# Patient Record
Sex: MALE | Race: BLACK OR AFRICAN AMERICAN | NOT HISPANIC OR LATINO | URBAN - METROPOLITAN AREA
[De-identification: names, ages, dates, MRNs, and addresses within clinical notes are randomized per-mention and may not be internally consistent; named-entity substitution may affect disease eponyms.]

---

## 2017-10-23 VITALS
DIASTOLIC BLOOD PRESSURE: 75 MMHG | OXYGEN SATURATION: 98 % | TEMPERATURE: 98 F | RESPIRATION RATE: 16 BRPM | HEART RATE: 72 BPM | WEIGHT: 302.92 LBS | HEIGHT: 78 IN | SYSTOLIC BLOOD PRESSURE: 129 MMHG

## 2017-10-23 RX ORDER — CHLORHEXIDINE GLUCONATE 213 G/1000ML
1 SOLUTION TOPICAL ONCE
Qty: 0 | Refills: 0 | Status: DISCONTINUED | OUTPATIENT
Start: 2017-10-24 | End: 2017-10-25

## 2017-10-23 NOTE — H&P ADULT - PMH
DM type 2 (diabetes mellitus, type 2)    Essential hypertension    HLD (hyperlipidemia)    PVD (peripheral vascular disease)  s/p PTA of Left AT

## 2017-10-23 NOTE — H&P ADULT - NSHPLABSRESULTS_GEN_ALL_CORE
11.7   8.0   )-----------( 220      ( 24 Oct 2017 15:10 )             35.6   10-24    140  |  101  |  15  ----------------------------<  87  4.0   |  26  |  0.76    Ca    9.2      24 Oct 2017 15:10    PT/INR - ( 24 Oct 2017 15:10 )   PT: 13.4 sec;   INR: 1.20          PTT - ( 24 Oct 2017 15:10 )  PTT:33.8 sec

## 2017-10-23 NOTE — H&P ADULT - ADDITIONAL PE
EKG: Sinus rhythm @ 68 bpm; sinus arrythmia with 1st degree AV block; LAD; inverted T waves in lead 3, AVF

## 2017-10-23 NOTE — H&P ADULT - ATTENDING COMMENTS
Agree w History and Physical, History of Present Illness, Allergies/Medications, Patient History, Risk Assessment, Physical Exam, Labs and Results, Assessment and Plan

## 2017-10-23 NOTE — H&P ADULT - SKIN COMMENTS
pigmentation and ulcers present on B/L lower legs pigmentation and ulcers present on B/L lower legs, ulcer present on L great toe

## 2017-10-23 NOTE — H&P ADULT - ASSESSMENT
61 y/o M, former cocaine abuser (27 yrs ago), PMHx significant for HTN, HLD, DM-II, PVD, s/p angioplasty of Left AT who presents for left heart cardiac cath with possible intervention for suspected CAD secondary to pt's risk factors, CCS class III anginal equivalent symptoms, abnormal ECG and Echo.    H/H 11.7/35.6 .Pt denies bleeding, GI bleeding, hematemesis, hematuria, BRBPR or melena . mg X 1 and Plavix 600 mg X 1given pre-cath.  Cr. 0.76. LVEF unknown. Pt. on Lasix 80 mg for chronic lower extremity edema.  Pt. euvolemic in exam. IV NS 0.45 % @ 50 cc/hr pre-cath.  Risks & benefits of procedure and alternative therapy have been explained to the patient including but not limited to: allergic reaction, bleeding w/possible need for blood transfusion, infection, renal and vascular compromise, limb damage, arrhythmia, stroke, vessel dissection/perforation, Myocardial infarction, emergent CABG. Informed consent obtained and in chart. 63 y/o M, former cocaine abuser (27 yrs ago), PMHx significant for HTN, HLD, DM-II, PVD, s/p angioplasty of Left AT, Chronic B/L lower extremity edema who presents for left heart cardiac cath with possible intervention for suspected CAD secondary to pt's risk factors, CCS class III anginal equivalent symptoms, abnormal ECG and Echo.    H/H 11.7/35.6 .Pt denies bleeding, GI bleeding, hematemesis, hematuria, BRBPR or melena . mg X 1 and Plavix 600 mg X 1given pre-cath.  Cr. 0.76. LVEF unknown. Pt. on Lasix 80 mg for B/L chronic lower extremity edema.  Pt. euvolemic in exam. IV NS 0.45 % @ 50 cc/hr pre-cath.  Risks & benefits of procedure and alternative therapy have been explained to the patient including but not limited to: allergic reaction, bleeding w/possible need for blood transfusion, infection, renal and vascular compromise, limb damage, arrhythmia, stroke, vessel dissection/perforation, Myocardial infarction, emergent CABG. Informed consent obtained and in chart.

## 2017-10-23 NOTE — H&P ADULT - HISTORY OF PRESENT ILLNESS
***POOR HISTORIAN***    63 y/o M, former cocaine abuser (27 yrs ago), PMHx significant for HTN, HLD, DM-II, PVD, s/p angioplasty of Left AT as per MD's note, who presented to his cardiologist with c/o SHERIFF, on walking 3 blocks, relieved with rest.  As per MD's note, pt also c/o chest pressure, however he denies it.  Pt was noted to have inferior wall MI on his ECG and severe LVH.  Pt c/o LE edema b/l, Left >>Right.  Pt also has pain on walking on less than half a block, Right>>Left, and left great toe ulcer.  Denies any dizziness, n/v, diaphoresis, orthopnea, PND, palpitations, syncope.    In light of pt's risk factors, CCS class II-III anginal equivalent symptoms, and abnormal ECG, he is recommended for cardiac cath with possible intervention for suspected CAD. ***POOR HISTORIAN***    61 y/o M, former cocaine abuser (27 yrs ago), PMHx significant for HTN, HLD, DM-II, PVD, s/p angioplasty of Left AT as per MD's note, who presented to his cardiologist with c/o SHERIFF, on walking 2-3 blocks, relieved with rest.  As per MD's note, pt also c/o chest pressure, however he denies it.  Pt was noted to have inferior wall MI on his ECG and echo revealed severe lvh and inferior wall hk (as per MD note)..  Pt c/o LE edema b/l, Left >>Right.  Pt also has pain on walking on less than half a block, Right>>Left, and left great toe ulcer.  Denies any dizziness, n/v, diaphoresis, orthopnea, PND, palpitations, syncope.    In light of pt's risk factors, CCS class III anginal equivalent symptoms, and abnormal ECG, he is recommended for cardiac cath with possible intervention for suspected CAD. ***POOR HISTORIAN***    63 y/o M, former cocaine abuser (27 yrs ago), PMHx significant for HTN, HLD, DM-II, PVD, s/p angioplasty of Left AT as per MD's note, chronic B/L lower extremity edema who presented to his cardiologist with c/o SHERIFF, on walking 2-3 blocks, relieved with rest.  As per MD's note, pt also c/o chest pressure, however he denies it.  Pt was noted to have inferior wall MI on his ECG and echo revealed severe lvh and inferior wall hk (as per MD note)..  Pt c/o LE edema b/l, Left >>Right.  Pt also has pain on walking on less than half a block, Right>>Left, and left great toe ulcer.  Denies any dizziness, n/v, diaphoresis, orthopnea, PND, palpitations, syncope.    In light of pt's risk factors, CCS class III anginal equivalent symptoms, and abnormal ECG, he is recommended for cardiac cath with possible intervention for suspected CAD.

## 2017-10-24 ENCOUNTER — INPATIENT (INPATIENT)
Facility: HOSPITAL | Age: 62
LOS: 0 days | Discharge: HOME HEALTH | DRG: 287 | End: 2017-10-25
Attending: INTERNAL MEDICINE | Admitting: INTERNAL MEDICINE
Payer: MEDICARE

## 2017-10-24 LAB
ANION GAP SERPL CALC-SCNC: 13 MMOL/L — SIGNIFICANT CHANGE UP (ref 5–17)
APTT BLD: 33.8 SEC — SIGNIFICANT CHANGE UP (ref 27.5–37.4)
BASOPHILS NFR BLD AUTO: 0.3 % — SIGNIFICANT CHANGE UP (ref 0–2)
BUN SERPL-MCNC: 15 MG/DL — SIGNIFICANT CHANGE UP (ref 7–23)
CALCIUM SERPL-MCNC: 9.2 MG/DL — SIGNIFICANT CHANGE UP (ref 8.4–10.5)
CHLORIDE SERPL-SCNC: 101 MMOL/L — SIGNIFICANT CHANGE UP (ref 96–108)
CO2 SERPL-SCNC: 26 MMOL/L — SIGNIFICANT CHANGE UP (ref 22–31)
CREAT SERPL-MCNC: 0.76 MG/DL — SIGNIFICANT CHANGE UP (ref 0.5–1.3)
EOSINOPHIL NFR BLD AUTO: 2.4 % — SIGNIFICANT CHANGE UP (ref 0–6)
GLUCOSE SERPL-MCNC: 87 MG/DL — SIGNIFICANT CHANGE UP (ref 70–99)
HCT VFR BLD CALC: 35.6 % — LOW (ref 39–50)
HGB BLD-MCNC: 11.7 G/DL — LOW (ref 13–17)
INR BLD: 1.2 — HIGH (ref 0.88–1.16)
LYMPHOCYTES # BLD AUTO: 22.1 % — SIGNIFICANT CHANGE UP (ref 13–44)
MCHC RBC-ENTMCNC: 27 PG — SIGNIFICANT CHANGE UP (ref 27–34)
MCHC RBC-ENTMCNC: 32.9 G/DL — SIGNIFICANT CHANGE UP (ref 32–36)
MCV RBC AUTO: 82.2 FL — SIGNIFICANT CHANGE UP (ref 80–100)
MONOCYTES NFR BLD AUTO: 9.3 % — SIGNIFICANT CHANGE UP (ref 2–14)
NEUTROPHILS NFR BLD AUTO: 65.9 % — SIGNIFICANT CHANGE UP (ref 43–77)
PLATELET # BLD AUTO: 220 K/UL — SIGNIFICANT CHANGE UP (ref 150–400)
POTASSIUM SERPL-MCNC: 4 MMOL/L — SIGNIFICANT CHANGE UP (ref 3.5–5.3)
POTASSIUM SERPL-SCNC: 4 MMOL/L — SIGNIFICANT CHANGE UP (ref 3.5–5.3)
PROTHROM AB SERPL-ACNC: 13.4 SEC — HIGH (ref 9.8–12.7)
RBC # BLD: 4.33 M/UL — SIGNIFICANT CHANGE UP (ref 4.2–5.8)
RBC # FLD: 13.8 % — SIGNIFICANT CHANGE UP (ref 10.3–16.9)
SODIUM SERPL-SCNC: 140 MMOL/L — SIGNIFICANT CHANGE UP (ref 135–145)
WBC # BLD: 8 K/UL — SIGNIFICANT CHANGE UP (ref 3.8–10.5)
WBC # FLD AUTO: 8 K/UL — SIGNIFICANT CHANGE UP (ref 3.8–10.5)

## 2017-10-24 PROCEDURE — 93458 L HRT ARTERY/VENTRICLE ANGIO: CPT | Mod: 26

## 2017-10-24 PROCEDURE — 93010 ELECTROCARDIOGRAM REPORT: CPT | Mod: 25,59

## 2017-10-24 PROCEDURE — 99222 1ST HOSP IP/OBS MODERATE 55: CPT | Mod: 25

## 2017-10-24 RX ORDER — NIFEDIPINE 30 MG
1 TABLET, EXTENDED RELEASE 24 HR ORAL
Qty: 0 | Refills: 0 | COMMUNITY

## 2017-10-24 RX ORDER — SODIUM CHLORIDE 9 MG/ML
1000 INJECTION, SOLUTION INTRAVENOUS
Qty: 0 | Refills: 0 | Status: DISCONTINUED | OUTPATIENT
Start: 2017-10-24 | End: 2017-10-25

## 2017-10-24 RX ORDER — SODIUM CHLORIDE 9 MG/ML
500 INJECTION, SOLUTION INTRAVENOUS
Qty: 0 | Refills: 0 | Status: DISCONTINUED | OUTPATIENT
Start: 2017-10-24 | End: 2017-10-24

## 2017-10-24 RX ORDER — ASPIRIN/CALCIUM CARB/MAGNESIUM 324 MG
325 TABLET ORAL ONCE
Qty: 0 | Refills: 0 | Status: COMPLETED | OUTPATIENT
Start: 2017-10-24 | End: 2017-10-24

## 2017-10-24 RX ORDER — FUROSEMIDE 40 MG
80 TABLET ORAL DAILY
Qty: 0 | Refills: 0 | Status: DISCONTINUED | OUTPATIENT
Start: 2017-10-24 | End: 2017-10-25

## 2017-10-24 RX ORDER — DEXTROSE 50 % IN WATER 50 %
1 SYRINGE (ML) INTRAVENOUS ONCE
Qty: 0 | Refills: 0 | Status: DISCONTINUED | OUTPATIENT
Start: 2017-10-24 | End: 2017-10-25

## 2017-10-24 RX ORDER — METOPROLOL TARTRATE 50 MG
12.5 TABLET ORAL
Qty: 0 | Refills: 0 | Status: DISCONTINUED | OUTPATIENT
Start: 2017-10-24 | End: 2017-10-25

## 2017-10-24 RX ORDER — DEXTROSE 50 % IN WATER 50 %
25 SYRINGE (ML) INTRAVENOUS ONCE
Qty: 0 | Refills: 0 | Status: DISCONTINUED | OUTPATIENT
Start: 2017-10-24 | End: 2017-10-25

## 2017-10-24 RX ORDER — LOSARTAN POTASSIUM 100 MG/1
1 TABLET, FILM COATED ORAL
Qty: 0 | Refills: 0 | COMMUNITY

## 2017-10-24 RX ORDER — SIMVASTATIN 20 MG/1
20 TABLET, FILM COATED ORAL AT BEDTIME
Qty: 0 | Refills: 0 | Status: DISCONTINUED | OUTPATIENT
Start: 2017-10-24 | End: 2017-10-25

## 2017-10-24 RX ORDER — ASPIRIN/CALCIUM CARB/MAGNESIUM 324 MG
81 TABLET ORAL DAILY
Qty: 0 | Refills: 0 | Status: DISCONTINUED | OUTPATIENT
Start: 2017-10-24 | End: 2017-10-25

## 2017-10-24 RX ORDER — CLOPIDOGREL BISULFATE 75 MG/1
600 TABLET, FILM COATED ORAL ONCE
Qty: 0 | Refills: 0 | Status: COMPLETED | OUTPATIENT
Start: 2017-10-24 | End: 2017-10-24

## 2017-10-24 RX ORDER — FUROSEMIDE 40 MG
1 TABLET ORAL
Qty: 0 | Refills: 0 | COMMUNITY

## 2017-10-24 RX ORDER — DEXTROSE 50 % IN WATER 50 %
12.5 SYRINGE (ML) INTRAVENOUS ONCE
Qty: 0 | Refills: 0 | Status: DISCONTINUED | OUTPATIENT
Start: 2017-10-24 | End: 2017-10-25

## 2017-10-24 RX ORDER — INSULIN LISPRO 100/ML
VIAL (ML) SUBCUTANEOUS
Qty: 0 | Refills: 0 | Status: DISCONTINUED | OUTPATIENT
Start: 2017-10-24 | End: 2017-10-25

## 2017-10-24 RX ORDER — LOSARTAN POTASSIUM 100 MG/1
100 TABLET, FILM COATED ORAL DAILY
Qty: 0 | Refills: 0 | Status: DISCONTINUED | OUTPATIENT
Start: 2017-10-24 | End: 2017-10-25

## 2017-10-24 RX ORDER — NIFEDIPINE 30 MG
90 TABLET, EXTENDED RELEASE 24 HR ORAL DAILY
Qty: 0 | Refills: 0 | Status: DISCONTINUED | OUTPATIENT
Start: 2017-10-24 | End: 2017-10-25

## 2017-10-24 RX ORDER — GLUCAGON INJECTION, SOLUTION 0.5 MG/.1ML
1 INJECTION, SOLUTION SUBCUTANEOUS ONCE
Qty: 0 | Refills: 0 | Status: DISCONTINUED | OUTPATIENT
Start: 2017-10-24 | End: 2017-10-25

## 2017-10-24 RX ORDER — INSULIN LISPRO 100/ML
VIAL (ML) SUBCUTANEOUS ONCE
Qty: 0 | Refills: 0 | Status: DISCONTINUED | OUTPATIENT
Start: 2017-10-24 | End: 2017-10-24

## 2017-10-24 RX ORDER — SIMVASTATIN 20 MG/1
1 TABLET, FILM COATED ORAL
Qty: 0 | Refills: 0 | COMMUNITY

## 2017-10-24 RX ADMIN — Medication 12.5 MILLIGRAM(S): at 22:41

## 2017-10-24 RX ADMIN — Medication 1: at 22:41

## 2017-10-24 RX ADMIN — CLOPIDOGREL BISULFATE 600 MILLIGRAM(S): 75 TABLET, FILM COATED ORAL at 16:26

## 2017-10-24 RX ADMIN — Medication 325 MILLIGRAM(S): at 16:26

## 2017-10-24 RX ADMIN — SIMVASTATIN 20 MILLIGRAM(S): 20 TABLET, FILM COATED ORAL at 22:41

## 2017-10-25 VITALS
HEART RATE: 63 BPM | RESPIRATION RATE: 17 BRPM | SYSTOLIC BLOOD PRESSURE: 117 MMHG | DIASTOLIC BLOOD PRESSURE: 60 MMHG | OXYGEN SATURATION: 98 %

## 2017-10-25 LAB
ALBUMIN SERPL ELPH-MCNC: 3.5 G/DL — SIGNIFICANT CHANGE UP (ref 3.3–5)
ALP SERPL-CCNC: 56 U/L — SIGNIFICANT CHANGE UP (ref 40–120)
ALT FLD-CCNC: 18 U/L — SIGNIFICANT CHANGE UP (ref 10–45)
ANION GAP SERPL CALC-SCNC: 12 MMOL/L — SIGNIFICANT CHANGE UP (ref 5–17)
AST SERPL-CCNC: 16 U/L — SIGNIFICANT CHANGE UP (ref 10–40)
BILIRUB SERPL-MCNC: 0.5 MG/DL — SIGNIFICANT CHANGE UP (ref 0.2–1.2)
BUN SERPL-MCNC: 12 MG/DL — SIGNIFICANT CHANGE UP (ref 7–23)
CALCIUM SERPL-MCNC: 9.1 MG/DL — SIGNIFICANT CHANGE UP (ref 8.4–10.5)
CHLORIDE SERPL-SCNC: 98 MMOL/L — SIGNIFICANT CHANGE UP (ref 96–108)
CO2 SERPL-SCNC: 26 MMOL/L — SIGNIFICANT CHANGE UP (ref 22–31)
CREAT SERPL-MCNC: 0.73 MG/DL — SIGNIFICANT CHANGE UP (ref 0.5–1.3)
GLUCOSE SERPL-MCNC: 103 MG/DL — HIGH (ref 70–99)
HBA1C BLD-MCNC: 5.3 % — SIGNIFICANT CHANGE UP (ref 4–5.6)
HCT VFR BLD CALC: 39.9 % — SIGNIFICANT CHANGE UP (ref 39–50)
HGB BLD-MCNC: 12.8 G/DL — LOW (ref 13–17)
MAGNESIUM SERPL-MCNC: 2.1 MG/DL — SIGNIFICANT CHANGE UP (ref 1.6–2.6)
MCHC RBC-ENTMCNC: 26.5 PG — LOW (ref 27–34)
MCHC RBC-ENTMCNC: 32.1 G/DL — SIGNIFICANT CHANGE UP (ref 32–36)
MCV RBC AUTO: 82.6 FL — SIGNIFICANT CHANGE UP (ref 80–100)
PLATELET # BLD AUTO: 228 K/UL — SIGNIFICANT CHANGE UP (ref 150–400)
POTASSIUM SERPL-MCNC: 3.7 MMOL/L — SIGNIFICANT CHANGE UP (ref 3.5–5.3)
POTASSIUM SERPL-SCNC: 3.7 MMOL/L — SIGNIFICANT CHANGE UP (ref 3.5–5.3)
PROT SERPL-MCNC: 8.2 G/DL — SIGNIFICANT CHANGE UP (ref 6–8.3)
RBC # BLD: 4.83 M/UL — SIGNIFICANT CHANGE UP (ref 4.2–5.8)
RBC # FLD: 13.6 % — SIGNIFICANT CHANGE UP (ref 10.3–16.9)
SODIUM SERPL-SCNC: 136 MMOL/L — SIGNIFICANT CHANGE UP (ref 135–145)
WBC # BLD: 7.4 K/UL — SIGNIFICANT CHANGE UP (ref 3.8–10.5)
WBC # FLD AUTO: 7.4 K/UL — SIGNIFICANT CHANGE UP (ref 3.8–10.5)

## 2017-10-25 PROCEDURE — 99239 HOSP IP/OBS DSCHRG MGMT >30: CPT | Mod: 25

## 2017-10-25 RX ORDER — ASPIRIN/CALCIUM CARB/MAGNESIUM 324 MG
1 TABLET ORAL
Qty: 0 | Refills: 0 | COMMUNITY

## 2017-10-25 RX ORDER — ASPIRIN/CALCIUM CARB/MAGNESIUM 324 MG
1 TABLET ORAL
Qty: 0 | Refills: 0 | COMMUNITY
Start: 2017-10-25

## 2017-10-25 RX ORDER — GLIPIZIDE/METFORMIN HCL 2.5-500 MG
1 TABLET ORAL
Qty: 0 | Refills: 0 | COMMUNITY

## 2017-10-25 RX ORDER — METOPROLOL TARTRATE 50 MG
0.5 TABLET ORAL
Qty: 0 | Refills: 0 | COMMUNITY
Start: 2017-10-25

## 2017-10-25 RX ORDER — POTASSIUM CHLORIDE 20 MEQ
40 PACKET (EA) ORAL ONCE
Qty: 0 | Refills: 0 | Status: COMPLETED | OUTPATIENT
Start: 2017-10-25 | End: 2017-10-25

## 2017-10-25 RX ORDER — METOPROLOL TARTRATE 50 MG
12.5 TABLET ORAL
Qty: 0 | Refills: 0 | COMMUNITY

## 2017-10-25 RX ADMIN — LOSARTAN POTASSIUM 100 MILLIGRAM(S): 100 TABLET, FILM COATED ORAL at 05:40

## 2017-10-25 RX ADMIN — Medication 40 MILLIEQUIVALENT(S): at 11:27

## 2017-10-25 RX ADMIN — Medication 90 MILLIGRAM(S): at 05:40

## 2017-10-25 RX ADMIN — Medication 80 MILLIGRAM(S): at 05:40

## 2017-10-25 RX ADMIN — Medication 12.5 MILLIGRAM(S): at 05:43

## 2017-10-25 RX ADMIN — Medication 81 MILLIGRAM(S): at 11:27

## 2017-10-25 NOTE — DISCHARGE NOTE ADULT - HOSPITAL COURSE
Pt is a 63 yo M with a PMHx of HTN, HLD, DM II, PVD s/p angioplasty of Left AT per MD's note, chronic B/L lower extremity edema who presented to the cardiologist with SHERIFF on walking 2-3 blocks that was relieved with rest. MD also states that pt was c/o chest pressure which patient denied on admission. EKG showed inferior wall MI and echo revealed severe LVH and inferior wall hypokinesis (per MD note). Pt c/o B/L edema with L>>R, left great toe ulcer (chronic for the past 4 yrs), and pain on walking < 1/2 block with R>>L. Pt denied any dizziness, diaphoresis, n/v, orthopnea, PND, palpitations and syncope. Pt was recommended cardiac catheterization with possible intervention considering his CCS class III symptoms, EKG and echo results. He underwent catheterization on 10/24/2017 which revealed mild luminal irregularities in the LAD/RCA/LCX with EF 50%, EDP wnl. He was kept overnight per Dr. Jose Rooney for consult with wound care regarding 2.5 cm x 2.5 cm ulcer on the dorsal left foot. Pt seen this am and is asymptomatic with no new complaints. Right radial access site is stable, vitals wnl and labs wnl. Pt is stable and ready for discharge per Dr. Rooney. Patient will follow up with  _________ . Pt is a 63 yo M with a PMHx of HTN, HLD, DM II, PVD s/p angioplasty of Left AT per MD's note, chronic B/L lower extremity edema who presented to the cardiologist with SHERIFF on walking 2-3 blocks that was relieved with rest. MD also states that pt was c/o chest pressure which patient denied on admission. EKG showed inferior wall MI and echo revealed severe LVH and inferior wall hypokinesis (per MD note). Pt c/o B/L edema with L>>R, left great toe ulcer (chronic for the past 4 yrs), and pain on walking < 1/2 block with R>>L. Pt denied any dizziness, diaphoresis, n/v, orthopnea, PND, palpitations and syncope. Pt was recommended cardiac catheterization with possible intervention considering his CCS class III symptoms, EKG and echo results. He underwent catheterization on 10/24/2017 which revealed mild luminal irregularities in the LAD/RCA/LCX with EF 50%, EDP wnl. He was kept overnight per Dr. Jose Rooney for consult with wound care regarding 2.5 cm x 2.5 cm ulcer on the dorsal left foot. Pt seen this am and is asymptomatic with no new complaints. Right radial access site is stable, vitals wnl and labs wnl. B/L wounds redressed per wound care instructions and pt scheduled to receive home care for wound management until follow up with Dr. Rooney. Pt is stable and ready for discharge per Dr. Irwin.

## 2017-10-25 NOTE — DISCHARGE NOTE ADULT - CARE PLAN
Principal Discharge DX:	SHERIFF (dyspnea on exertion)  Goal:	prevent further shortness of breath  Instructions for follow-up, activity and diet:	You underwent a cardiac angiogram which revealed only mild irregularities in the vessels of the heart which required no stents. Continue Aspirin 81 mg once daily. Avoid strenuous activity or heavy lifting for the next five days. Do not take a bath or swim for the next five days; you may shower. For any bleeding or hematoma formation (hardened blood collection under the skin) at the access site of right wrist please hold pressure and go to the emergency room. Please follow up with Dr. Rooney in 1-2 weeks. For recurrent chest pain or difficulty breathing, please call your doctor or go to the emergency room.  Secondary Diagnosis:	Essential hypertension  Goal:	control blood pressure  Instructions for follow-up, activity and diet:	Please continue the medications Furosemide (Lasix) 80mg, Losartan 100 mg, Metoprolol tartrate 12.5 mg and Nifedipine XL 90 mg, as listed to keep your blood pressure controlled. For blood pressure that is too high or too low please see your doctor or go to the emergency room as necessary.  Secondary Diagnosis:	HLD (hyperlipidemia)  Goal:	control cholesterol levels  Instructions for follow-up, activity and diet:	Please continue Simvastatin 20 mg once daily to keep your cholesterol low. High cholesterol contributes to heart disease. Follow up with your PCP for further cholesterol management.  Secondary Diagnosis:	PVD (peripheral vascular disease)  Goal:	prevent further vascular damage  Instructions for follow-up, activity and diet:	Continue periodic dressing changes for wounds on the lower legs. For worsening, painful wounds or wounds with signs of infection such as redness, discharge or fever see your cardiologist or PCP.  Secondary Diagnosis:	DM type 2 (diabetes mellitus, type 2)  Goal:	control blood sugar levels  Instructions for follow-up, activity and diet:	Restart glipizide-metformin 5-500 mg twice daily on Friday 10/27/2017 . Maintain a low carbohydrate diet. Check your blood sugar regularly. For blood sugar that is too high or too low please call your doctor or go to the emergency room as necessary. Continue monitoring lower extremities for any worsening ulcers or for signs of infection such as redness, increased discharge or fever. Principal Discharge DX:	SHERIFF (dyspnea on exertion)  Goal:	prevent further shortness of breath  Instructions for follow-up, activity and diet:	You underwent a cardiac angiogram which revealed only mild irregularities in the vessels of the heart which required no stents. Continue Aspirin 81 mg once daily. Avoid strenuous activity or heavy lifting for the next five days. Do not take a bath or swim for the next five days; you may shower. For any bleeding or hematoma formation (hardened blood collection under the skin) at the access site of right wrist please hold pressure and go to the emergency room. Please follow up with Dr. Rooney in 1-2 weeks. For recurrent chest pain or difficulty breathing, please call your doctor or go to the emergency room.  Secondary Diagnosis:	Essential hypertension  Goal:	control blood pressure  Instructions for follow-up, activity and diet:	Please continue the medications Furosemide (Lasix) 80mg, Losartan 100 mg, Metoprolol tartrate 12.5 mg and Nifedipine XL 90 mg, as listed to keep your blood pressure controlled. For blood pressure that is too high or too low please see your doctor or go to the emergency room as necessary.  Secondary Diagnosis:	HLD (hyperlipidemia)  Goal:	control cholesterol levels  Instructions for follow-up, activity and diet:	Please continue Simvastatin 20 mg once daily to keep your cholesterol low. High cholesterol contributes to heart disease. Follow up with your PCP for further cholesterol management.  Secondary Diagnosis:	PVD (peripheral vascular disease)  Goal:	prevent further vascular damage  Instructions for follow-up, activity and diet:	Continue periodic dressing changes for wounds on the lower legs per wound care doctors instructions. For worsening, painful wounds or wounds with signs of infection such as redness, discharge or fever see your cardiologist or PCP. Follow up with Dr. Rooney for further management.  Secondary Diagnosis:	DM type 2 (diabetes mellitus, type 2)  Goal:	control blood sugar levels  Instructions for follow-up, activity and diet:	Restart glipizide-metformin 5-500 mg twice daily on Friday 10/27/2017 . Maintain a low carbohydrate diet. Check your blood sugar regularly. For blood sugar that is too high or too low please call your doctor or go to the emergency room as necessary. Continue monitoring lower extremities for any worsening ulcers or for signs of infection such as redness, increased discharge or fever. Principal Discharge DX:	SHERIFF (dyspnea on exertion)  Goal:	prevent further shortness of breath  Instructions for follow-up, activity and diet:	You underwent a cardiac angiogram which revealed only mild irregularities in the vessels of the heart which required no stents. Continue Aspirin 81 mg once daily. Avoid strenuous activity or heavy lifting for the next five days. Do not take a bath or swim for the next five days; you may shower. For any bleeding or hematoma formation (hardened blood collection under the skin) at the access site of right wrist please hold pressure and go to the emergency room. Please follow up with Dr. Rooney in 1-2 weeks. For recurrent chest pain or difficulty breathing, please call your doctor or go to the emergency room.  Secondary Diagnosis:	Essential hypertension  Goal:	control blood pressure  Instructions for follow-up, activity and diet:	Please continue the medications Furosemide (Lasix) 80mg, Losartan 100 mg, Metoprolol tartrate 12.5 mg and Nifedipine XL 90 mg, as listed to keep your blood pressure controlled. For blood pressure that is too high or too low please see your doctor or go to the emergency room as necessary.  Secondary Diagnosis:	HLD (hyperlipidemia)  Goal:	control cholesterol levels  Instructions for follow-up, activity and diet:	Please continue Simvastatin 20 mg once daily to keep your cholesterol low. High cholesterol contributes to heart disease. Follow up with your PCP for further cholesterol management.  Secondary Diagnosis:	PVD (peripheral vascular disease)  Goal:	prevent further vascular damage  Instructions for follow-up, activity and diet:	For wounds on the left leg and foot cleanse with saline every other day and apply Medihoney and a 4x4 dressing with Kerlix dressing. For the right leg cleanse with saline every other day, apply Aquacel extra ABD pad and Kerlix dressing. For the bilateral lower legs, apply Actractain cream to dry skin once daily. For worsening, painful wounds or wounds with signs of infection such as redness, discharge or fever see your cardiologist or PCP. Follow up with Dr. Rooney for further management.  Secondary Diagnosis:	DM type 2 (diabetes mellitus, type 2)  Goal:	control blood sugar levels  Instructions for follow-up, activity and diet:	Restart glipizide-metformin 5-500 mg twice daily on Friday 10/27/2017 . Maintain a low carbohydrate diet. Check your blood sugar regularly. For blood sugar that is too high or too low please call your doctor or go to the emergency room as necessary. Continue monitoring lower extremities for any worsening ulcers or for signs of infection such as redness, increased discharge or fever.

## 2017-10-25 NOTE — DISCHARGE NOTE ADULT - MEDICATION SUMMARY - MEDICATIONS TO TAKE
I will START or STAY ON the medications listed below when I get home from the hospital:    aspirin 81 mg oral delayed release tablet  -- 1 tab(s) by mouth once a day  -- Indication: For Peripheral vascular disease    losartan 100 mg oral tablet  -- 1 tab(s) by mouth once a day  -- Indication: For Blood pressure    glipizide-metformin 5 mg-500 mg oral tablet  -- 1 tab(s) by mouth 2 times a day. PLEASE HOLD METFORMIN FOR 48HRS, RESTART ON FRIDAY 10/27/17   -- Indication: For Diabetes    simvastatin 20 mg oral tablet  -- 1 tab(s) by mouth once a day (at bedtime)  -- Indication: For Cholesterol    metoprolol tartrate 25 mg oral tablet  -- 0.5 tab(s) by mouth 2 times a day  -- Indication: For Blood pressure    NIFEdipine 90 mg oral tablet, extended release  -- 1 tab(s) by mouth once a day  -- Indication: For Blood pressure    furosemide 80 mg oral tablet  -- 1 tab(s) by mouth once a day  -- Indication: For Blood pressure/Fluid retention

## 2017-10-25 NOTE — ADVANCED PRACTICE NURSE CONSULT - RECOMMEDATIONS
LLE ulcers - cleanse with normal saline, apply Medihoney, 4x4 gauze and kerlix every other day.  RLE ulcers - cleanse with normal saline, apply Aquacel Extra, Abd pad, Kerlix every other day  Bilateral LE dry, flaky skin - apply Atractain cream once daily.  Patient would benefit from home care for dressing changes.  Discussed assessment and recommendations with patient, RN, Shana,  and Jorje HERNANDES.

## 2017-10-25 NOTE — DISCHARGE NOTE ADULT - PLAN OF CARE
Continue periodic dressing changes for wounds on the lower legs. For worsening, painful wounds or wounds with signs of infection such as redness, discharge or fever see your cardiologist or PCP. control blood sugar levels Restart glipizide-metformin 5-500 mg twice daily on Friday 10/27/2017 . Maintain a low carbohydrate diet. Check your blood sugar regularly. For blood sugar that is too high or too low please call your doctor or go to the emergency room as necessary. Continue monitoring lower extremities for any worsening ulcers or for signs of infection such as redness, increased discharge or fever. prevent further shortness of breath You underwent a cardiac angiogram which revealed only mild irregularities in the vessels of the heart which required no stents. Continue Aspirin 81 mg once daily. Avoid strenuous activity or heavy lifting for the next five days. Do not take a bath or swim for the next five days; you may shower. For any bleeding or hematoma formation (hardened blood collection under the skin) at the access site of right wrist please hold pressure and go to the emergency room. Please follow up with Dr. Rooney in 1-2 weeks. For recurrent chest pain or difficulty breathing, please call your doctor or go to the emergency room. control blood pressure Please continue the medications Furosemide (Lasix) 80mg, Losartan 100 mg, Metoprolol tartrate 12.5 mg and Nifedipine XL 90 mg, as listed to keep your blood pressure controlled. For blood pressure that is too high or too low please see your doctor or go to the emergency room as necessary. control cholesterol levels Please continue Simvastatin 20 mg once daily to keep your cholesterol low. High cholesterol contributes to heart disease. Follow up with your PCP for further cholesterol management. prevent further vascular damage Continue periodic dressing changes for wounds on the lower legs per wound care doctors instructions. For worsening, painful wounds or wounds with signs of infection such as redness, discharge or fever see your cardiologist or PCP. Follow up with Dr. Rooney for further management. For wounds on the left leg and foot cleanse with saline every other day and apply Medihoney and a 4x4 dressing with Kerlix dressing. For the right leg cleanse with saline every other day, apply Aquacel extra ABD pad and Kerlix dressing. For the bilateral lower legs, apply Actractain cream to dry skin once daily. For worsening, painful wounds or wounds with signs of infection such as redness, discharge or fever see your cardiologist or PCP. Follow up with Dr. Rooney for further management.

## 2017-10-25 NOTE — DISCHARGE NOTE ADULT - CARE PROVIDER_API CALL
Jose Rooney (JASS), Cardiovascular Disease; Interventional Cardiology; Nuclear Cardiology  [address] [id number]  Phone: [phone]  Fax: [phone]

## 2017-10-25 NOTE — DISCHARGE NOTE ADULT - SECONDARY DIAGNOSIS.
DM type 2 (diabetes mellitus, type 2) Essential hypertension HLD (hyperlipidemia) PVD (peripheral vascular disease)

## 2017-10-25 NOTE — ADVANCED PRACTICE NURSE CONSULT - ASSESSMENT
Patient presented on admission with dry ulcers on dorsal aspect of left foot; ulcer between 1st and 2nd toe with 20% red, non-granulating tissue and 80% yellow fibrin measuring 2 cm x 1.4 cm x 0.2 cm and ulcer on proximal aspect with 80% red, non-granulating tissue and 20% yellow fibrin, measuring 1 cm x 1.4 cm x 0.1 cm. RLE posterior aspect ulcer with 80% red, non-granulating tissue and 20% yellow fibrin, draining moderate amount of serosanguinous exudate. Bilateral LEs warm to touch, with dry, flaky skin and 2+ pitting edema to RLE and 4+ pitting edema to LLE. Patient reported he is followed in the community by Dr Park, who has applied Unna's boots in the past. Patient presented on admission with dry ulcers on dorsal aspect of left foot; ulcer between 1st and 2nd toe with 20% red, non-granulating tissue and 80% yellow fibrin measuring 2 cm x 1.4 cm x 0.2 cm and ulcer on proximal aspect with 80% red, non-granulating tissue and 20% yellow fibrin, measuring 1 cm x 1.4 cm x 0.1 cm. RLE posterior aspect ulcer with 80% red, non-granulating tissue and 20% yellow fibrin, draining moderate amount of serosanguinous exudate. Bilateral LEs warm to touch, with dry, flaky skin and 2+ pitting edema to RLE and 4+ pitting edema to LLE.   Instructed patient on wound care orders. Patient verbalized understanding. Patient reported he is followed in the community by Dr Park, who has applied Unna's boots in the past.   Provided patient with wouns care supplies pending ordering by home care nurse.

## 2017-10-25 NOTE — ADVANCED PRACTICE NURSE CONSULT - REASON FOR CONSULT
[facility] nurse consult for 63 y/o male with PMHx of HTN, HLD, DM-II, PVD, s/p angioplasty of Left AT as per MD's note, chronic B/L lower extremity edema who presented to his cardiologist with c/o SHERIFF, on walking 2-3 blocks, relieved with rest.  As per MD's note, pt also c/o chest pressure, however he denies it.  Pt was noted to have inferior wall MI on his ECG and echo revealed severe lvh and inferior wall hk (as per MD note)..  Pt c/o LE edema b/l, Left >>Right.  Pt also has pain on walking on less than half a block, Right>>Left, and left great toe ulcer.  Denies any dizziness, n/v, diaphoresis, orthopnea, PND, palpitations, syncope.

## 2017-10-25 NOTE — DISCHARGE NOTE ADULT - PATIENT PORTAL LINK FT
“You can access the [employer] Patient Portal, offered by [facility], by registering with the following website: [email or web address]”

## 2017-10-28 DIAGNOSIS — L97.919 NON-PRESSURE CHRONIC ULCER OF UNSPECIFIED PART OF RIGHT LOWER LEG WITH UNSPECIFIED SEVERITY: ICD-10-CM

## 2017-10-28 DIAGNOSIS — Z79.84 LONG TERM (CURRENT) USE OF ORAL HYPOGLYCEMIC DRUGS: ICD-10-CM

## 2017-10-28 DIAGNOSIS — I10 ESSENTIAL (PRIMARY) HYPERTENSION: ICD-10-CM

## 2017-10-28 DIAGNOSIS — R06.02 SHORTNESS OF BREATH: ICD-10-CM

## 2017-10-28 DIAGNOSIS — I73.9 PERIPHERAL VASCULAR DISEASE, UNSPECIFIED: ICD-10-CM

## 2017-10-28 DIAGNOSIS — E11.9 TYPE 2 DIABETES MELLITUS WITHOUT COMPLICATIONS: ICD-10-CM

## 2017-10-28 DIAGNOSIS — L97.529 NON-PRESSURE CHRONIC ULCER OF OTHER PART OF LEFT FOOT WITH UNSPECIFIED SEVERITY: ICD-10-CM

## 2017-10-28 DIAGNOSIS — E78.5 HYPERLIPIDEMIA, UNSPECIFIED: ICD-10-CM

## 2017-10-30 DIAGNOSIS — I25.10 ATHEROSCLEROTIC HEART DISEASE OF NATIVE CORONARY ARTERY WITHOUT ANGINA PECTORIS: ICD-10-CM

## 2017-12-19 PROCEDURE — 80048 BASIC METABOLIC PNL TOTAL CA: CPT

## 2017-12-19 PROCEDURE — 93005 ELECTROCARDIOGRAM TRACING: CPT

## 2017-12-19 PROCEDURE — 82962 GLUCOSE BLOOD TEST: CPT

## 2017-12-19 PROCEDURE — 85027 COMPLETE CBC AUTOMATED: CPT

## 2017-12-19 PROCEDURE — C1769: CPT

## 2017-12-19 PROCEDURE — 85025 COMPLETE CBC W/AUTO DIFF WBC: CPT

## 2017-12-19 PROCEDURE — 85730 THROMBOPLASTIN TIME PARTIAL: CPT

## 2017-12-19 PROCEDURE — 36415 COLL VENOUS BLD VENIPUNCTURE: CPT

## 2017-12-19 PROCEDURE — 83036 HEMOGLOBIN GLYCOSYLATED A1C: CPT

## 2017-12-19 PROCEDURE — C1887: CPT

## 2017-12-19 PROCEDURE — 85610 PROTHROMBIN TIME: CPT

## 2017-12-19 PROCEDURE — 80053 COMPREHEN METABOLIC PANEL: CPT

## 2017-12-19 PROCEDURE — 83735 ASSAY OF MAGNESIUM: CPT

## 2019-04-27 NOTE — H&P ADULT - RS GEN PE MLT RESP DETAILS PC
none
airway patent/breath sounds equal/clear to auscultation bilaterally/no chest wall tenderness/no wheezes/good air movement/normal/no intercostal retractions/no rhonchi/no rales/respirations non-labored

## 2022-02-21 NOTE — H&P ADULT - NS MD HP PULSE CAROTID
Addended by: STANISLAV WYATT on: 2/21/2022 08:12 AM     Modules accepted: Orders    
right normal/left normal

## 2023-07-18 NOTE — DISCHARGE NOTE ADULT - IF YOU ARE A SMOKER, IT IS IMPORTANT FOR YOUR HEALTH TO STOP SMOKING. PLEASE BE AWARE THAT SECOND HAND SMOKE IS ALSO HARMFUL.
Alprazolam 0.5 mg  Filled 6-20-23  Qty 40  0 refills  No upcoming appt.   LOV 3-7-23 SM Statement Selected